# Patient Record
Sex: MALE | Race: WHITE | NOT HISPANIC OR LATINO | Employment: FULL TIME | URBAN - METROPOLITAN AREA
[De-identification: names, ages, dates, MRNs, and addresses within clinical notes are randomized per-mention and may not be internally consistent; named-entity substitution may affect disease eponyms.]

---

## 2020-10-29 ENCOUNTER — HOSPITAL ENCOUNTER (EMERGENCY)
Facility: HOSPITAL | Age: 45
Discharge: HOME | End: 2020-10-29
Attending: EMERGENCY MEDICINE
Payer: OTHER GOVERNMENT

## 2020-10-29 VITALS
WEIGHT: 185 LBS | SYSTOLIC BLOOD PRESSURE: 115 MMHG | TEMPERATURE: 98 F | OXYGEN SATURATION: 97 % | HEIGHT: 73 IN | HEART RATE: 71 BPM | DIASTOLIC BLOOD PRESSURE: 69 MMHG | RESPIRATION RATE: 16 BRPM | BODY MASS INDEX: 24.52 KG/M2

## 2020-10-29 DIAGNOSIS — R10.30 GROIN PAIN: ICD-10-CM

## 2020-10-29 LAB
ALBUMIN SERPL BCP-MCNC: 3.7 G/DL (ref 3.5–5.2)
ALP SERPL-CCNC: 70 U/L (ref 55–135)
ALT SERPL W/O P-5'-P-CCNC: 14 U/L (ref 10–44)
ANION GAP SERPL CALC-SCNC: 6 MMOL/L (ref 8–16)
AST SERPL-CCNC: 16 U/L (ref 10–40)
BASOPHILS # BLD AUTO: 0.02 K/UL (ref 0–0.2)
BASOPHILS NFR BLD: 0.3 % (ref 0–1.9)
BILIRUB SERPL-MCNC: 1.2 MG/DL (ref 0.1–1)
BILIRUB UR QL STRIP: NEGATIVE
BUN SERPL-MCNC: 16 MG/DL (ref 6–20)
CALCIUM SERPL-MCNC: 9 MG/DL (ref 8.7–10.5)
CHLORIDE SERPL-SCNC: 104 MMOL/L (ref 95–110)
CLARITY UR REFRACT.AUTO: CLEAR
CO2 SERPL-SCNC: 27 MMOL/L (ref 23–29)
COLOR UR AUTO: YELLOW
CREAT SERPL-MCNC: 1 MG/DL (ref 0.5–1.4)
CRP SERPL-MCNC: 46.9 MG/L (ref 0–8.2)
DIFFERENTIAL METHOD: ABNORMAL
EOSINOPHIL # BLD AUTO: 0.1 K/UL (ref 0–0.5)
EOSINOPHIL NFR BLD: 1.1 % (ref 0–8)
ERYTHROCYTE [DISTWIDTH] IN BLOOD BY AUTOMATED COUNT: 11.9 % (ref 11.5–14.5)
ERYTHROCYTE [SEDIMENTATION RATE] IN BLOOD BY WESTERGREN METHOD: 11 MM/HR (ref 0–23)
EST. GFR  (AFRICAN AMERICAN): >60 ML/MIN/1.73 M^2
EST. GFR  (NON AFRICAN AMERICAN): >60 ML/MIN/1.73 M^2
GLUCOSE SERPL-MCNC: 104 MG/DL (ref 70–110)
GLUCOSE UR QL STRIP: NEGATIVE
HCT VFR BLD AUTO: 38 % (ref 40–54)
HGB BLD-MCNC: 13.4 G/DL (ref 14–18)
HGB UR QL STRIP: NEGATIVE
IMM GRANULOCYTES # BLD AUTO: 0.01 K/UL (ref 0–0.04)
IMM GRANULOCYTES NFR BLD AUTO: 0.2 % (ref 0–0.5)
KETONES UR QL STRIP: NEGATIVE
LEUKOCYTE ESTERASE UR QL STRIP: NEGATIVE
LYMPHOCYTES # BLD AUTO: 0.9 K/UL (ref 1–4.8)
LYMPHOCYTES NFR BLD: 14.1 % (ref 18–48)
MCH RBC QN AUTO: 34.1 PG (ref 27–31)
MCHC RBC AUTO-ENTMCNC: 35.3 G/DL (ref 32–36)
MCV RBC AUTO: 97 FL (ref 82–98)
MONOCYTES # BLD AUTO: 0.7 K/UL (ref 0.3–1)
MONOCYTES NFR BLD: 10.9 % (ref 4–15)
NEUTROPHILS # BLD AUTO: 4.7 K/UL (ref 1.8–7.7)
NEUTROPHILS NFR BLD: 73.4 % (ref 38–73)
NITRITE UR QL STRIP: NEGATIVE
NRBC BLD-RTO: 0 /100 WBC
PH UR STRIP: 6 [PH] (ref 5–8)
PLATELET # BLD AUTO: 133 K/UL (ref 150–350)
PMV BLD AUTO: 10.3 FL (ref 9.2–12.9)
POTASSIUM SERPL-SCNC: 4.3 MMOL/L (ref 3.5–5.1)
PROT SERPL-MCNC: 6.4 G/DL (ref 6–8.4)
PROT UR QL STRIP: NEGATIVE
RBC # BLD AUTO: 3.93 M/UL (ref 4.6–6.2)
SODIUM SERPL-SCNC: 137 MMOL/L (ref 136–145)
SP GR UR STRIP: 1.01 (ref 1–1.03)
URN SPEC COLLECT METH UR: NORMAL
WBC # BLD AUTO: 6.45 K/UL (ref 3.9–12.7)

## 2020-10-29 PROCEDURE — 80053 COMPREHEN METABOLIC PANEL: CPT

## 2020-10-29 PROCEDURE — 99284 PR EMERGENCY DEPT VISIT,LEVEL IV: ICD-10-PCS | Mod: ,,, | Performed by: EMERGENCY MEDICINE

## 2020-10-29 PROCEDURE — 81003 URINALYSIS AUTO W/O SCOPE: CPT

## 2020-10-29 PROCEDURE — A9585 GADOBUTROL INJECTION: HCPCS | Performed by: EMERGENCY MEDICINE

## 2020-10-29 PROCEDURE — 25000003 PHARM REV CODE 250: Performed by: STUDENT IN AN ORGANIZED HEALTH CARE EDUCATION/TRAINING PROGRAM

## 2020-10-29 PROCEDURE — 86140 C-REACTIVE PROTEIN: CPT

## 2020-10-29 PROCEDURE — 99285 EMERGENCY DEPT VISIT HI MDM: CPT | Mod: 25

## 2020-10-29 PROCEDURE — 85025 COMPLETE CBC W/AUTO DIFF WBC: CPT

## 2020-10-29 PROCEDURE — 63600175 PHARM REV CODE 636 W HCPCS: Performed by: STUDENT IN AN ORGANIZED HEALTH CARE EDUCATION/TRAINING PROGRAM

## 2020-10-29 PROCEDURE — 25500020 PHARM REV CODE 255: Performed by: EMERGENCY MEDICINE

## 2020-10-29 PROCEDURE — 99284 EMERGENCY DEPT VISIT MOD MDM: CPT | Mod: ,,, | Performed by: EMERGENCY MEDICINE

## 2020-10-29 PROCEDURE — 96374 THER/PROPH/DIAG INJ IV PUSH: CPT

## 2020-10-29 PROCEDURE — 85652 RBC SED RATE AUTOMATED: CPT

## 2020-10-29 RX ORDER — HYDROCODONE BITARTRATE AND ACETAMINOPHEN 7.5; 325 MG/1; MG/1
1 TABLET ORAL EVERY 4 HOURS PRN
Qty: 18 TABLET | Refills: 0 | Status: SHIPPED | OUTPATIENT
Start: 2020-10-29 | End: 2020-11-01

## 2020-10-29 RX ORDER — GADOBUTROL 604.72 MG/ML
9 INJECTION INTRAVENOUS
Status: COMPLETED | OUTPATIENT
Start: 2020-10-29 | End: 2020-10-29

## 2020-10-29 RX ORDER — KETOROLAC TROMETHAMINE 30 MG/ML
15 INJECTION, SOLUTION INTRAMUSCULAR; INTRAVENOUS
Status: COMPLETED | OUTPATIENT
Start: 2020-10-29 | End: 2020-10-29

## 2020-10-29 RX ORDER — LISINOPRIL 5 MG/1
5 TABLET ORAL DAILY
COMMUNITY

## 2020-10-29 RX ORDER — HYDROCODONE BITARTRATE AND ACETAMINOPHEN 5; 325 MG/1; MG/1
1 TABLET ORAL
Status: COMPLETED | OUTPATIENT
Start: 2020-10-29 | End: 2020-10-29

## 2020-10-29 RX ADMIN — KETOROLAC TROMETHAMINE 15 MG: 30 INJECTION, SOLUTION INTRAMUSCULAR; INTRAVENOUS at 12:10

## 2020-10-29 RX ADMIN — GADOBUTROL 9 ML: 604.72 INJECTION INTRAVENOUS at 06:10

## 2020-10-29 RX ADMIN — HYDROCODONE BITARTRATE AND ACETAMINOPHEN 1 TABLET: 5; 325 TABLET ORAL at 08:10

## 2020-10-29 NOTE — ED PROVIDER NOTES
"Encounter Date: 10/29/2020       History     Chief Complaint   Patient presents with    Groin Pain     Monday started with left groin pain, now worse.  Pt denies injury. Was seen at [facility], [facility] u/a and u/s. Normal urination and bms     Patient is a 45-year-old male with a past medical history of HTN coming in for groin pain x3 days. Reports that it initially began in his left groin as a "twinge" on Monday afternoon, first noticed as he was getting off of his truck, and believed he may have pulled something in his groin. However, the severity has gradually progressed since. It is made worse with movement; patient now has to walk on crutches. States he has to brace himself before moving from a supine position to a sitting position and vice versa. Unable to lie on either side. Yesterday, the pain migrated to his right groin. The pain is currently in the center over the pubic region. Has had some intermittent radiation to his testicles as well as the anterior thighs. Denies any trauma. Stated that he went to [facility] emergency room yesterday where he had a UA done along with XRs and a testicular US that were unrevealing. Was discharged with the diagnosis of an MSK etiology. Has taken anti-inflammatories without relief. He denies fevers, chills, nausea/vomiting, abdominal pain, dysuria, hematuria, flank pain and no overlying skin changes to pubic region.    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         Review of patient's allergies indicates:   Allergen Reactions    Doxycycline     Sulfa (sulfonamide antibiotics) Rash     Past Medical History:   Diagnosis Date    GERD (gastroesophageal reflux disease)     Hypertension      Past Surgical History:   Procedure Laterality Date    peridontal       No family history on file.  Social History     Tobacco Use    Smoking status: Never Smoker   Substance Use Topics "    Alcohol use: Yes     Comment: occasionally    Drug use: Not on file     Review of Systems   Constitutional: Negative for activity change, chills and fever.   HENT: Negative for congestion, ear pain and sore throat.    Respiratory: Negative for shortness of breath and stridor.    Cardiovascular: Negative for chest pain and palpitations.   Gastrointestinal: Negative for abdominal pain, nausea and vomiting.   Genitourinary: Positive for testicular pain. Negative for difficulty urinating, dysuria, flank pain and frequency.   Neurological: Negative for dizziness, syncope and headaches.       Physical Exam     Initial Vitals [10/29/20 1108]   BP Pulse Resp Temp SpO2   130/80 82 16 98.2 °F (36.8 °C) 98 %      MAP       --         Physical Exam    Nursing note and vitals reviewed.  Constitutional: He appears well-developed and well-nourished. He is not diaphoretic. No distress.   Well-appearing. Evident difficulty climbing from wheelchair to transitioning to crutches, to sitting on bed secondary to pain.   HENT:   Head: Normocephalic and atraumatic.   Right Ear: External ear normal.   Left Ear: External ear normal.   Neck: Neck supple.   Cardiovascular: Normal rate, regular rhythm, normal heart sounds and intact distal pulses.   Pulmonary/Chest: Breath sounds normal. No respiratory distress. He has no wheezes. He has no rhonchi. He has no rales.   Abdominal: Soft. He exhibits no distension. There is no abdominal tenderness. There is no rebound, no guarding and no CVA tenderness.   No masses or visual abnormalities to abdomen. No tenderness to deep palpation.   Genitourinary:    Genitourinary Comments: No blood/discharge at meatus. There is no tenderness to palpation of either testis. No erythema or swelling noted to scrotum. Cremasteric reflexes are present bilaterally. No palpable inguinal hernias. Point tenderness over pubic symphysis.     Neurological: He is alert and oriented to person, place, and time. GCS score  is 15. GCS eye subscore is 4. GCS verbal subscore is 5. GCS motor subscore is 6.   Skin: Skin is warm. Capillary refill takes less than 2 seconds. No rash noted.   Psychiatric: He has a normal mood and affect.         ED Course   Procedures  Labs Reviewed   CBC W/ AUTO DIFFERENTIAL - Abnormal; Notable for the following components:       Result Value    RBC 3.93 (*)     Hemoglobin 13.4 (*)     Hematocrit 38.0 (*)     MCH 34.1 (*)     Platelets 133 (*)     Lymph # 0.9 (*)     Gran % 73.4 (*)     Lymph % 14.1 (*)     All other components within normal limits   COMPREHENSIVE METABOLIC PANEL - Abnormal; Notable for the following components:    Total Bilirubin 1.2 (*)     Anion Gap 6 (*)     All other components within normal limits   C-REACTIVE PROTEIN - Abnormal; Notable for the following components:    CRP 46.9 (*)     All other components within normal limits   SEDIMENTATION RATE   URINALYSIS, REFLEX TO URINE CULTURE    Narrative:     Specimen Source->Urine          Imaging Results          MRI Pelvis W WO Contrast (Final result)  Result time 10/29/20 19:13:16    Final result by Richard Dunbar MD (10/29/20 19:13:16)                 Impression:      Strain of the anterior hip rotator and adductor muscles, left greater than right as described in detail above.  No evidence of avulsion or significant hematoma.    Mild reactive type edema in the pubic symphysis without discrete changes of sports hernia.    No intrinsic hip pathology.    Mild insertional tendinitis of the hamstring tendons on the ischial tuberosity, left greater than right.      Electronically signed by: Richard Dunbar  Date:    10/29/2020  Time:    19:13             Narrative:    EXAMINATION:  MRI PELVIS W WO CONTRAST    CLINICAL HISTORY:  pubic symphysis pain;    TECHNIQUE:  Multiplanar multisequence MR imaging of the pelvis and right left hips were obtained.  Gadolinium enhanced images were obtained with 9 mL of Gadavist  intravenously.    COMPARISON:  Plain film of the pelvis, 10/29/2020    FINDINGS:  There is normal bone marrow signal throughout the pelvic bones without evidence of fracture or bone destructive process.  Small normal herniation pit cysts at the femoral head neck junction are noted bilaterally.  There is no evidence of femoral or acetabular fracture.  Mild fraying of the labrum is noted at the chondrolabral junction on the right but without evidence of distinct labral tear.    There is no impingement anatomy.    There is edema within the rotator muscles of the hips bilaterally, left greater than right including the obturator externus and adductor brevis and pectineus muscle.  There is minimal reactive edema in the pubic symphysis bone without fracture.  Discrete changes of sports hernia are not identified.    The sacrum and SI joints as well as the lower lumbar spine appear normal.  Mild insertional tendinitis of the hamstring tendons on the ischial tuberosity are present.  The hip rotators appear unremarkable.  Pelvic structures appear normal.  Following gadolinium administration for yields no enhancing mass or membrane.                               X-Ray Pelvis Routine AP (Final result)  Result time 10/29/20 14:00:57    Final result by Duke Sandoval III, MD (10/29/20 14:00:57)                 Narrative:    EXAMINATION:  XR PELVIS ROUTINE AP    CLINICAL HISTORY:  Lower abdominal pain, unspecified    FINDINGS:  One view: No fracture dislocation bone destruction seen.      Electronically signed by: Duke Sandoval MD  Date:    10/29/2020  Time:    14:00                             X-Ray Femur AP/LAT Left (Final result)  Result time 10/29/20 14:10:20    Final result by Jared Lui MD (10/29/20 14:10:20)                 Impression:      No acute abnormality      Electronically signed by: Jared Lui MD  Date:    10/29/2020  Time:    14:10             Narrative:    EXAMINATION:  XR FEMUR 2 VIEW LEFT    CLINICAL  "HISTORY:  Lower abdominal pain, unspecified    TECHNIQUE:  AP and lateral views of the left femur were performed.    COMPARISON:  None}    FINDINGS:  Bones are well mineralized.  The left femur is intact.  No fracture, dislocation, or osseous destruction.  No soft tissue abnormality appreciated.  Mild sclerosis at the symphysis pubis.                               X-Ray Femur 2 AP/LAT Right (Final result)  Result time 10/29/20 13:59:25    Final result by Duke Sandoval III, MD (10/29/20 13:59:25)                 Narrative:    EXAMINATION:  XR FEMUR 2 VIEW RIGHT    CLINICAL HISTORY:  Lower abdominal pain, unspecified    FINDINGS:  Two views right femur.    No fracture dislocation bone destruction seen.  No trauma seen.      Electronically signed by: Duke Sandoval MD  Date:    10/29/2020  Time:    13:59                               Medical Decision Making:   Differential Diagnosis:   Incarcerated inguinal hernia, strangulated inguinal hernia, bowel obstruction, testicular torsion, epididymitis, hydrocele, spermatocele, varicocele, trauma, malignancy  Clinical Tests:   Lab Tests: Ordered and Reviewed  The following lab test(s) were unremarkable: CBC and CMP  Radiological Study: Ordered and Reviewed  ED Management:  Patient is a well-appearing, afebrile 45 year old male coming in for groin pain x 3 days. Patient was relatively pain free upon sitting upright in bed. Evident pain upon standing from a seated position. Benign abdominal and  exam. Having BMs and passing gas, lower suspicion for small bowel obstruction. Doubt torsion given present cremasteric reflexes. No palpable hernias. He "unofficially curbsided" an orthopedic colleague, who stated this may be osteitis pubis. Pelvic and bilateral femoral XRs obtained. Left femoral XR read showed mild sclerosis of pubic symphysis. Elevated CRP. MRI pending. Disposition likely discharge with follow up with orthopedics on an outpatient basis. Patient signed out to Drs. " Terri.                             Clinical Impression:       ICD-10-CM ICD-9-CM   1. Groin pain  R10.30 789.09                      Disposition:   Disposition: Discharged     ED Disposition Condition    Discharge Stable        ED Prescriptions     Medication Sig Dispense Start Date End Date Auth. Provider    HYDROcodone-acetaminophen (NORCO) 7.5-325 mg per tablet Take 1 tablet by mouth every 4 (four) hours as needed for Pain. 18 tablet 10/29/2020 11/1/2020 Davy Corea MD        Follow-up Information     Follow up With Specialties Details Why Contact Info Additional Information    [facility] - Orthoped[facility] In 3 days  [address]  [place] [facility] [address] [phone] [facility] - Main Building, 5th Floor Please park in [address] and take Atrium elevator                           Wily Donato MD  Resident  10/30/20 0109

## 2020-11-02 NOTE — PROGRESS NOTES
CC:  Bilateral groin pain    45 y.o. Male who presents as a new patient to me. He and his wife are members of our [employer].  Complaint is bilateral hip pain which he localizes more towards the midline at the pubic symphysis.  Pain was of relatively acute onset recently.  The patient denies any specific injury mechanism.  The pain developed fairly rapidly over a short period and has progressively worsened to the point that he has difficulty weight-bearing.  He denies any pre-existing issues.  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  No numbness or tingling.  No fevers chills or night sweats.  No difficulty urinating or with defecation.  No rectal numbness.  No scrotal or testicular pain.    This required a recent trip to the emergency department.    Negative for tobacco.   Negative for diabetes.     REVIEW OF SYSTEMS:   Constitution: Negative. Negative for chills, fever and night sweats.    Hematologic/Lymphatic: Negative for bleeding problem. Does not bruise/bleed easily.   Skin: Negative for dry skin, itching and rash.   Musculoskeletal: Negative for falls. Positive for left hip pain and muscle weakness.     All other review of symptoms were reviewed and found to be noncontributory.     PAST MEDICAL HISTORY:   Past Medical History:   Diagnosis Date    GERD (gastroesophageal reflux disease)     Hypertension        PAST SURGICAL HISTORY:   Past Surgical History:   Procedure Laterality Date    peridontal         FAMILY HISTORY:   History reviewed. No pertinent family history.    SOCIAL HISTORY:   Social History     Socioeconomic History    Marital status:      Spouse name: Not on file    Number of children: Not on file    Years of education: Not on file    Highest education level: Not on file   Occupational History    Not on file   Social Needs    Financial resource strain: Not on file    Food insecurity     Worry: Not on file     Inability: Not on file    Transportation needs  "    Medical: Not on file     Non-medical: Not on file   Tobacco Use    Smoking status: Never Smoker   Substance and Sexual Activity    Alcohol use: Yes     Comment: occasionally    Drug use: Not on file    Sexual activity: Not on file   Lifestyle    Physical activity     Days per week: Not on file     Minutes per session: Not on file    Stress: Not on file   Relationships    Social connections     Talks on phone: Not on file     Gets together: Not on file     Attends Gnosticist service: Not on file     Active member of club or organization: Not on file     Attends meetings of clubs or organizations: Not on file     Relationship status: Not on file   Other Topics Concern    Not on file   Social History Narrative    Not on file     MEDICATIONS:     Current Outpatient Medications:     esomeprazole (NEXIUM) 40 MG capsule, Take 1 capsule (40 mg total) by mouth before breakfast., Disp: 30 capsule, Rfl: 1    indomethacin (INDOCIN SR) 75 mg CpSR CR capsule, Take 1 capsule (75 mg total) by mouth 2 (two) times daily., Disp: 30 capsule, Rfl: 1    lisinopriL (PRINIVIL,ZESTRIL) 5 MG tablet, Take 5 mg by mouth once daily., Disp: , Rfl:     ALLERGIES:   Review of patient's allergies indicates:   Allergen Reactions    Doxycycline     Sulfa (sulfonamide antibiotics) Rash        PHYSICAL EXAMINATION:  BP (!) 137/91   Pulse 63   Ht 6' 1" (1.854 m)   Wt 84.8 kg (187 lb)   BMI 24.67 kg/m²   General: Well-developed well-nourished 45 y.o. malein no acute distress   Cardiovascular: Regular rhythm by palpation of distal pulse, normal color and temperature, no concerning varicosities on symptomatic side   Lungs: No labored breathing or wheezing appreciated   Neuro: Alert and oriented ×3   Psychiatric: well oriented to person, place and time, demonstrates normal mood and affect   Skin: No rashes, lesions or ulcers, normal temperature, turgor, and texture on involved extremity    Ortho/SPM Exam  Examination of both hips " demonstrates point tenderness over the proximal adductor attachments bilaterally.  Positive abdominal crunch for pain and discomfort.  Positive shoe tie sign bilaterally for pain.  Pain with resisted leg adduction bilaterally.  No pain or weakness with abduction.  Negative Stinchfield test.  No pain specifically over the anterior hip joint bilaterally.  No midline back pain.  Negative straight leg raise. Bilateral hip IR to 15 degrees.    IMAGING:  Radiographs of the pelvis demonstrated chronic ossific changes of the pubic symphysis.  Consistent with osteitis pubis.    MRI pelvis reviewed by me and discussed with patient. Study shows:      Strain of the anterior hip rotator and adductor muscles, left greater than right as described in detail above.  No evidence of avulsion or significant hematoma.     Mild reactive type edema in the pubic symphysis without discrete changes of sports hernia.     No intrinsic hip pathology.     Mild insertional tendinitis of the hamstring tendons on the ischial tuberosity, left greater than right.    On my read:  There is evidence of osteitis pubis with edema over the proximal AL and common aponeurosis.  Negative cleft sign.    ASSESSMENT:      ICD-10-CM ICD-9-CM   1. 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 Pubic bone pain  M89.9 733.90       PLAN:     Findings discussed with the patient.  He has restricted internal rotation bilaterally at the hips.  Imaging and exam findings demonstrate osteitis pubis with core muscle soft tissue related injury without specific recent injury mechanism.  The sign or symptoms concerning for traditional inguinal hernia.  Treatment options for the pelvis and hips discussed.  Conservative care recommended.  A Medrol pack was prescribed.  I will get him in with our physical therapy team to discuss a core, gluteal and hip abductor strengthening program.  Questions answered.  They will let me know how he is responding.

## 2020-11-03 ENCOUNTER — OFFICE VISIT (OUTPATIENT)
Dept: SPORTS MEDICINE | Facility: CLINIC | Age: 45
End: 2020-11-03
Payer: OTHER GOVERNMENT

## 2020-11-03 VITALS
HEART RATE: 63 BPM | BODY MASS INDEX: 24.78 KG/M2 | SYSTOLIC BLOOD PRESSURE: 137 MMHG | WEIGHT: 187 LBS | DIASTOLIC BLOOD PRESSURE: 91 MMHG | HEIGHT: 73 IN

## 2020-11-03 DIAGNOSIS — M89.9 PUBIC BONE PAIN: ICD-10-CM

## 2020-11-03 DIAGNOSIS — M86.9 OSTEITIS PUBIS: Primary | ICD-10-CM

## 2020-11-03 PROCEDURE — 99999 PR PBB SHADOW E&M-EST. PATIENT-LVL III: ICD-10-PCS | Mod: PBBFAC,,, | Performed by: ORTHOPAEDIC SURGERY

## 2020-11-03 PROCEDURE — 99204 OFFICE O/P NEW MOD 45 MIN: CPT | Mod: S$PBB,,, | Performed by: ORTHOPAEDIC SURGERY

## 2020-11-03 PROCEDURE — 99213 OFFICE O/P EST LOW 20 MIN: CPT | Mod: PBBFAC | Performed by: ORTHOPAEDIC SURGERY

## 2020-11-03 PROCEDURE — 99999 PR PBB SHADOW E&M-EST. PATIENT-LVL III: CPT | Mod: PBBFAC,,, | Performed by: ORTHOPAEDIC SURGERY

## 2020-11-03 PROCEDURE — 99204 PR OFFICE/OUTPT VISIT, NEW, LEVL IV, 45-59 MIN: ICD-10-PCS | Mod: S$PBB,,, | Performed by: ORTHOPAEDIC SURGERY

## 2020-11-03 RX ORDER — ESOMEPRAZOLE MAGNESIUM 40 MG/1
40 CAPSULE, DELAYED RELEASE ORAL
Qty: 30 CAPSULE | Refills: 1 | Status: SHIPPED | OUTPATIENT
Start: 2020-11-03 | End: 2021-11-03

## 2020-11-03 RX ORDER — INDOMETHACIN 75 MG/1
75 CAPSULE, EXTENDED RELEASE ORAL 2 TIMES DAILY
Qty: 30 CAPSULE | Refills: 1 | Status: SHIPPED | OUTPATIENT
Start: 2020-11-03 | End: 2021-01-08 | Stop reason: ALTCHOICE

## 2020-11-03 NOTE — LETTER
November 12, 2020      Debbie Dwyer MD  [address]  [id number]  [facility] [address]           [facility] 1st Fl  [address]  [facility] [address]  Phone: [phone]          Patient: Christiano Gonzalez   MR Number: [id number]   YOB: 1975   Date of Visit: 11/3/2020       Dear Dr. Debbie Dwyer:    Thank you for referring Christiano Gonzalez to me for evaluation. Attached you will find relevant portions of my assessment and plan of care.    If you have questions, please do not hesitate to call me. I look forward to following Christiano Gonzalez along with you.    Sincerely,    W Mike Alvarado MD    Enclosure  CC:  No Recipients    If you would like to receive this communication electronically, please contact [email or web address] or [phone] to request more information on [employer] access.    For providers and/or their staff who would like to refer a patient to [facility], please contact us through our one-stop-shop provider referral line, [facility], at [phone].    If you feel you have received this communication in error or would no longer like to receive these types of communications, please e-mail [email or web address]

## 2020-11-12 ENCOUNTER — CLINICAL SUPPORT (OUTPATIENT)
Dept: REHABILITATION | Facility: HOSPITAL | Age: 45
End: 2020-11-12
Payer: OTHER GOVERNMENT

## 2020-11-12 ENCOUNTER — TELEPHONE (OUTPATIENT)
Dept: SPORTS MEDICINE | Facility: CLINIC | Age: 45
End: 2020-11-12

## 2020-11-12 DIAGNOSIS — M89.9 PUBIC BONE PAIN: ICD-10-CM

## 2020-11-12 DIAGNOSIS — M89.9 PUBIC BONE PAIN: Primary | ICD-10-CM

## 2020-11-12 PROCEDURE — 97110 THERAPEUTIC EXERCISES: CPT

## 2020-11-12 PROCEDURE — 97161 PT EVAL LOW COMPLEX 20 MIN: CPT

## 2020-11-12 NOTE — TELEPHONE ENCOUNTER
The patient states he is doing better.  Off crutches.  Would like to return to some higher impact activity.  Plan to start with biking first.  I would like for him to see our physical therapy team to work on core/gluteal/hip abductor strengthening.  Physical therapy referral placed.

## 2020-11-13 NOTE — PLAN OF CARE
[facility]  Physical Therapy Initial Evaluation    Name: Christiano Gonzalez  Clinic Number: [id number]    Therapy Diagnosis:   Encounter Diagnosis   Name Primary?    Pubic bone pain      Physician: NITIN Alvarado MD    Physician Orders: PT Eval and Treat   Medical Diagnosis from Referral: M89.9 (ICD-10-CM) - Pubic bone pain  Evaluation Date: 11/12/2020  Authorization Period Expiration: 3/6/21  Plan of Care Expiration: 12/24/20  Visit # / Visits authorized: 1/ 16    Time In: 545 pm  Time Out: 640 pm  Total Billable Time: 55 minutes    Precautions: Standard    Subjective   Date of onset: 1 week prior  History of current condition - Christiano reports: this past Monday afternoon at the end of the day in groin with stepping forward. The next day it got worse and by the end of the he was unable to walk to his car. He went to the ER on Thursday and had an MRI and labs. He then followed up with Dr. Alvarado and was ordered steroids which helped. He was having to use an AD to walk and by the end of the week was walking like normal. PT referred to PT to improve and prevent symptoms.        Past Medical History:   Diagnosis Date    GERD (gastroesophageal reflux disease)     Hypertension      Christiano Gonzalez  has a past surgical history that includes peridontal.    Christiano has a current medication list which includes the following prescription(s): esomeprazole, indomethacin, and lisinopril.    Review of patient's allergies indicates:   Allergen Reactions    Doxycycline     Sulfa (sulfonamide antibiotics) Rash        Imaging, MRI studies:     Prior Therapy: yes  Social History: he lives with their family  Occupation: Dermatologist  Prior Level of Function: running 2-3 miles 1-2 x a week and occasional biking  Current Level of Function: limited with walking    Pain:  Current 0/10, worst 8/10, best 0/10   Location: bilateral hip   Description: Sharp  Aggravating Factors: Walking  Easing Factors:  "steroids    Pts goals: "return to running/biking"    Objective   Observation: patient appears stated age, NAD      Posture: rounded shoulders, fwd head, anterior R ASIS, posteriorly prominent PSIS L      Gait: narrow THIERNO, possible tibial medial rotation B    Range of Motion:  (PROM):  Hip Left Right   Flexion  (125) degrees  (120) degrees   Abduction  (25) degrees  (25) degrees   Ext Rotation  (40) degrees  (45) degrees   Int Rotation  (30) degrees  (25) degrees     Strength:  Hip Left Right   Flexion 5/5 5/5   Abduction 3+/5 3+/5   Adduction 5/5 5/5   Extension 3+/5 4/5   External Rot 5/5 5/5     Knee Left Right   Extension 5/5 5/5   Flexion 5/5 5/5     Special Tests:  Hip Left Right   LIANA Negative Negative   FADIR Positive Positive   LLD L post Positive- R ant   Scour Negative Negative     Joint Mobility: negative L1-L5 SPs and B facets    Palpation: TTP AP R ASIS    Sensation: WNL BLE    Flexibility: tight HS        TREATMENT   Treatment Time In: 625  Treatment Time Out: 640  Total Treatment time separate from Evaluation: 15 minutes    Christiano received therapeutic exercises to develop strength, endurance, ROM, posture and core stabilization for 10 minutes includin way SI reset 3x 5 sec hold ea way  Lateral walking gtb x2 laps  Hip thrusters 15# 3x10 with 3 sec hold  Runners pose 3x 30 sec B  Hip mobility 90/90 x20    Christiano received the following manual therapy techniques: Joint mobilizations and Manual traction were applied to the: SI for 5 minutes, including:  AP R ASIS grade IV   Hugo and reverse [place] B grade V        Home Exercises and Patient Education Provided    Education provided re: HEP to Go    Written Home Exercises Provided: Yes.  Exercises were reviewed and Christiano was able to demonstrate them prior to the end of the session.   Pt received a written copy of exercises to perform at home. Christiano demonstrated good  understanding of the education provided.     See EMR under patient " instructions for exercises given.   Assessment   Christiano is a 45 y.o. male referred to outpatient Physical Therapy with a medical diagnosis of SI pain. Pt presents with decrease B hip IR, weakness in B hip abd and ext, gait impairments, slight LLD and chronic plantar fascitis likely contributing to SI dysfunction pain.     Pt prognosis is Good.   Pt will benefit from skilled outpatient Physical Therapy to address the deficits stated above and in the chart below, provide pt/family education, and to maximize pt's level of independence.     Plan of care discussed with patient: Yes  Pt's spiritual, cultural and educational needs considered and patient is agreeable to the plan of care and goals as stated below:     Anticipated Barriers for therapy: none    Medical Necessity is demonstrated by the following  History  Co-morbidities and personal factors that may impact the plan of care Co-morbidities:   none    Personal Factors:   no deficits     low   Examination  Body Structures and Functions, activity limitations and participation restrictions that may impact the plan of care Body Regions:   back    Body Systems:    ROM  strength  gait  motor control    Participation Restrictions:   none    Activity limitations:   Learning and applying knowledge  no deficits    General Tasks and Commands  no deficits    Communication  no deficits    Mobility  walking    Self care  no deficits    Domestic Life  no deficits    Interactions/Relationships  no deficits    Life Areas  no deficits    Community and Social Life  no deficits         low   Clinical Presentation stable and uncomplicated low   Decision Making/ Complexity Score: low     Goals:  Short Term Goals: 3 weeks   - Pt will increase ROM to 30 deg B hip IR  - Pt will increase strength to 4/5 B hip ext and abd  - Decrease Pain to 2/10 as worst with cycling 1 mile  - Pt to self correct posture and gait with minimal cues  - Pt independent with HEP with progressions.     Long Term  Goals (6 Weeks):  - Pt will return to full prior level work outs  - Pt will increase strength to 5/5 B hip ext and abd for improve hip/SI stability  - Decrease Pain to 0/10 with cycling 3 miles  - Pt to return to 80% PLOF    Plan   Plan of care Certification: 11/12/2020 to 12/24/20.    Outpatient Physical Therapy 1 times weekly for 6 weeks to include the following interventions: Manual Therapy, Moist Heat/ Ice, Neuromuscular Re-ed, Therapeutic Activites and Therapeutic Exercise.     Ruth Cummings, PT, DPT, COMT

## 2020-12-10 ENCOUNTER — CLINICAL SUPPORT (OUTPATIENT)
Dept: REHABILITATION | Facility: HOSPITAL | Age: 45
End: 2020-12-10
Payer: OTHER GOVERNMENT

## 2020-12-10 DIAGNOSIS — M25.559 HIP PAIN: ICD-10-CM

## 2020-12-10 PROCEDURE — 97110 THERAPEUTIC EXERCISES: CPT

## 2020-12-10 NOTE — PROGRESS NOTES
"                          Physical Therapy Daily Treatment Note     Name: Christiano Gonzalez  Clinic Number: [id number]    Therapy Diagnosis:   Encounter Diagnosis   Name Primary?    Hip pain      Physician: NITIN Alvarado MD    Visit Date: 12/10/2020    Physician Orders: PT Eval and Treat   Medical Diagnosis from Referral: M89.9 (ICD-10-CM) - Pubic bone pain  Evaluation Date: 11/12/2020  Authorization Period Expiration: 3/6/21  Plan of Care Expiration: 12/24/20  Visit # / Visits authorized: 2/ 16     Time In: 530  pm  Time Out: 620 pm  Total Billable Time: 50 minutes     Precautions: Standard       Subjective      Pt reports:he has been doing his exercises and eager to return to running.    he was compliant with home exercise program given last session.   Response to previous treatment:good  Functional change: improved glut strength    Pain: 0/10  Location: bilateral hips      Objective     Christiano received therapeutic exercises to develop strength, endurance, ROM and core stabilization for 50 minutes including:  Bike x8 min  5 way SI reset 3x 5 sec hold ea way  Lateral walking gtb x2 laps  Hip thrusters 15# 3x10 with 3 sec hold  Runners pose 3x 30 sec B  Hip mobility 90/90 x20    Running assessment x5 min  RDLs with foam 3x10 15# KB  3D hamstring 3x10  Prone hip flexion on SB 3x10            Home Exercises Provided and Patient Education Provided     Education provided:   - HEP to Go    Written Home Exercises Provided: yes.  Exercises were reviewed and Christiano was able to demonstrate them prior to the end of the session.  Christiano demonstrated good  understanding of the education provided.     See EMR under Patient Instructions for exercises provided {Blank single:07906::"12/10/2020","prior visit"    Assessment     Upon reassessment, pt has shown good improvements with glut strengthening so running assessment was performed which mainly showed slightly increased stride on R and increased adduction B. He was provided " updated HEP and demonstrated good understanding to progress functional gains independently. Plan to follow with patient via email if he needs any further assistance.     Christiano is progressing well towards his goals.   Pt prognosis is Good.     Pt will continue to benefit from skilled outpatient physical therapy to address the deficits listed in the problem list box on initial evaluation, provide pt/family education and to maximize pt's level of independence in the home and community environment.     Pt's spiritual, cultural and educational needs considered and pt agreeable to plan of care and goals.    Anticipated barriers to physical therapy: none    Goals:   Short Term Goals: 3 weeks (ME)  - Pt will increase ROM to 30 deg B hip IR  - Pt will increase strength to 4/5 B hip ext and abd  - Decrease Pain to 2/10 as worst with cycling 1 mile  - Pt to self correct posture and gait with minimal cues  - Pt independent with HEP with progressions.      Long Term Goals (6 Weeks): (Progressing, not met)  - Pt will return to full prior level work outs  - Pt will increase strength to 5/5 B hip ext and abd for improve hip/SI stability  - Decrease Pain to 0/10 with cycling 3 miles  - Pt to return to 80% PLOF    Plan     Plan to follow up with patient if he reaches within a month.     Ruth Cummings, PT

## 2021-01-08 ENCOUNTER — TELEPHONE (OUTPATIENT)
Dept: SPORTS MEDICINE | Facility: CLINIC | Age: 46
End: 2021-01-08

## 2021-01-08 DIAGNOSIS — M70.72 ISCHIAL BURSITIS OF LEFT SIDE: Primary | ICD-10-CM

## 2021-01-08 RX ORDER — DICLOFENAC SODIUM 75 MG/1
75 TABLET, DELAYED RELEASE ORAL 2 TIMES DAILY PRN
Qty: 60 TABLET | Refills: 1 | Status: SHIPPED | OUTPATIENT
Start: 2021-01-08 | End: 2021-02-07